# Patient Record
Sex: MALE | Race: BLACK OR AFRICAN AMERICAN | NOT HISPANIC OR LATINO | URBAN - METROPOLITAN AREA
[De-identification: names, ages, dates, MRNs, and addresses within clinical notes are randomized per-mention and may not be internally consistent; named-entity substitution may affect disease eponyms.]

---

## 2024-04-22 ENCOUNTER — APPOINTMENT (OUTPATIENT)
Dept: OTOLARYNGOLOGY | Facility: HOSPITAL | Age: 34
End: 2024-04-22
Payer: COMMERCIAL

## 2024-06-24 ENCOUNTER — APPOINTMENT (OUTPATIENT)
Dept: OTOLARYNGOLOGY | Facility: HOSPITAL | Age: 34
End: 2024-06-24
Payer: COMMERCIAL

## 2024-07-30 ENCOUNTER — OFFICE VISIT (OUTPATIENT)
Dept: OTOLARYNGOLOGY | Facility: CLINIC | Age: 34
End: 2024-07-30
Payer: COMMERCIAL

## 2024-07-30 VITALS — BODY MASS INDEX: 26.24 KG/M2 | HEIGHT: 73 IN | TEMPERATURE: 97 F | WEIGHT: 198 LBS

## 2024-07-30 DIAGNOSIS — R09.89 THROAT CLEARING: Primary | ICD-10-CM

## 2024-07-30 PROCEDURE — 99203 OFFICE O/P NEW LOW 30 MIN: CPT | Performed by: OTOLARYNGOLOGY

## 2024-07-30 PROCEDURE — 3008F BODY MASS INDEX DOCD: CPT | Performed by: OTOLARYNGOLOGY

## 2024-07-30 PROCEDURE — 31575 DIAGNOSTIC LARYNGOSCOPY: CPT | Performed by: OTOLARYNGOLOGY

## 2024-07-30 PROCEDURE — 1036F TOBACCO NON-USER: CPT | Performed by: OTOLARYNGOLOGY

## 2024-07-30 PROCEDURE — 99213 OFFICE O/P EST LOW 20 MIN: CPT | Performed by: OTOLARYNGOLOGY

## 2024-07-30 ASSESSMENT — PAIN SCALES - GENERAL: PAINLEVEL: 0-NO PAIN

## 2024-07-30 NOTE — PATIENT INSTRUCTIONS
You are a chronic throat clearer and you are not alone! The causes of chronic throat clearing include acid reflux (laryngopharyngeal reflux), allergies, environmental irritants such as tobacco smoke and air pollution, and asthma. If present for a long time, throat clearing can become habit forming. When you clear your throat, you are transferring mucus from your throat up into your mouth and nose. We all secrete up to 2 liters (imagine a big Coke bottle) of mucus a day. This saliva is usually swallowed and eventually ends up in the toilet. By clearing the mucus back into your mouth and nose you are sending secretions in the wrong direction, which is counter productive. The mucus will still work its way back down to the throat and eventually be swallowed. So, help the mucus go in the right direction in the first place! Swallow! Swallow! Swallow! No throat clearing.     Chronic throat clearing is damaging. The trauma from the throat clearing can cause redness and swelling of your vocal cords. If the clearing is very excessive small growths (granulomas) can form. The irritation and swelling caused by the clearing can cause saliva to sit in your throat. This causes more throat clearing. More throat clearing causes more stagnant mucus which causes more throat clearing which causes more mucus, etc... A vicious cycle will ensue and the habit can be very difficult to break. Without a conscious effort on your part to break the cycle, the throat clearing may never stop.     Your doctor may prescribe medication and behavioral modifications to treat acid reflux disease. Nose and throat spray may be prescribed to treat underlying allergies or asthma. Medications alone will not solve the problem. The following alterations are recommended:    1). Do not clear your throat. Swallow instead. This gets mucus going in the right direction.     2). Carry around some water to assist with swallowing and mucus clearance. When you feel the  "urge to clear your throat take a sip of water,    3). If you absolutely need to clear your throat, perform a non-traumatic throat clear. To do this, pant with your mouth open and say \"HUH, HUH, HUH, HUH\" with a powerful but very breathy voice. This will clear the secretions without causing damage.     4).  Increase water intake. This will thin secretions and make it easier to swallow.     5).  Comply with the behavior recommendations for reflux disease.     6). Encourage your friends and family to tell you to swallow when you clear your throat. Some people have been clearing so long that they don't even realize they are doing it.     7).  Increase humidification: a dry environment can contribute to dry irritated tissues.  Use a cool mist humidifier at night and limit the use of heaters and air conditioning.     8). Don't overuse your voice, as this can also contribute to throat irritation. Limit loud talking as well as cell phone conversations.     9).  Be patient. The urge to clear your throat will not go away overnight. It may take 8 to 12 weeks for the medication and behavior modifications to work.     GOOD LUCK!    "